# Patient Record
Sex: FEMALE | Race: BLACK OR AFRICAN AMERICAN
[De-identification: names, ages, dates, MRNs, and addresses within clinical notes are randomized per-mention and may not be internally consistent; named-entity substitution may affect disease eponyms.]

---

## 2017-10-30 ENCOUNTER — HOSPITAL ENCOUNTER (EMERGENCY)
Dept: HOSPITAL 62 - ER | Age: 52
Discharge: HOME | End: 2017-10-30
Payer: COMMERCIAL

## 2017-10-30 VITALS — DIASTOLIC BLOOD PRESSURE: 67 MMHG | SYSTOLIC BLOOD PRESSURE: 130 MMHG
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
## 2017-10-30 DIAGNOSIS — S63.501A: Primary | ICD-10-CM

## 2017-10-30 DIAGNOSIS — M25.531: ICD-10-CM

## 2017-10-30 DIAGNOSIS — M79.642: ICD-10-CM
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
## 2017-10-30 PROCEDURE — 73130 X-RAY EXAM OF HAND: CPT

## 2017-10-30 PROCEDURE — 73110 X-RAY EXAM OF WRIST: CPT

## 2017-10-30 PROCEDURE — 99283 EMERGENCY DEPT VISIT LOW MDM: CPT

## 2017-10-30 PROCEDURE — L3908 WHO COCK-UP NONMOLDE PRE OTS: HCPCS

## 2017-10-30 NOTE — RADIOLOGY REPORT (SQ)
EXAM DESCRIPTION:  HAND RIGHT 3 VIEWS



COMPLETED DATE/TIME:  10/30/2017 3:28 pm



REASON FOR STUDY:  pain and injury



COMPARISON:  None.



EXAM PARAMETERS:  NUMBER OF VIEWS: Three views.

TECHNIQUE: AP, lateral and oblique  radiographic images acquired of the right hand.

LIMITATIONS: None.



FINDINGS:  MINERALIZATION: Normal.

BONES: No acute fracture or dislocation.  No worrisome bone lesions.

JOINTS: No effusions.

SOFT TISSUES: No soft tissue swelling.  No foreign body.

OTHER: No other significant finding.



IMPRESSION:  NEGATIVE STUDY OF THE RIGHT HAND. NO RADIOGRAPHIC EVIDENCE OF ACUTE INJURY.



TECHNICAL DOCUMENTATION:  JOB ID:  [id number]

 2011 [employer]- All Rights Reserved

## 2017-10-30 NOTE — ER DOCUMENT REPORT
ED Hand/Wrist Injury





- General


Chief Complaint: Wrist Injury


Stated Complaint: RIGHT WRIST PAIN


Time Seen by Provider: 10/30/17 15:05


Mode of Arrival: Ambulatory


Information source: Patient


Notes: 





52-year-old female presents to ED for complaint of pain and swelling to the 

right wrist and hand.  She states that 3:30 AM while at work at the [employer] 

she was pushing a heavy mail container and she stopped pushing and then when 

she went to push again she had very sharp pain in her radial side of her right 

wrist and hand.  She states very shortly after that it started to swell and the 

pain started radiating around the hand and wrist.  She states she has been 

using ice since then but the pain is not getting better with Tylenol.  She 

states she has never injured this hand in the past.


TRAVEL OUTSIDE OF THE [place] IN LAST 30 DAYS: No





- HPI


Injury to: Hand, Wrist


Onset: This morning


Where: Work


Timing: Still present


Quality of pain: Sharp, Throbbing


Severity: Moderate


Pain Level: 4


Context: Swelling





- Related Data


Allergies/Adverse Reactions: 


 





No Known Allergies Allergy (Unverified 10/30/17 14:56)


 











Past Medical History





- General


Information source: Patient





- Social History


Smoking Status: Never Smoker


Cigarette use (# per day): No


Chew tobacco use (# tins/day): No


Smoking Education Provided: No


Frequency of alcohol use: Social


Drug Abuse: None


Occupation: [employer] office


Lives with: Alone


Family History: CAD, CVA, DM, Hyperlipidemia, Hypertension


Patient has suicidal ideation: No


Patient has homicidal ideation: No





- Past Medical History


Cardiac Medical History: Reports: None


Pulmonary Medical History: Reports: Hx Pneumonia


EENT Medical History: Reports: None


Neurological Medical History: Reports: None


Endocrine Medical History: Reports: None


Renal/ Medical History: Reports: None


Malignancy Medical History: Reports: None


GI Medical History: Reports: None


Musculoskeltal Medical History: Reports Hx Musculoskeletal Trauma - Strain to 

her back and her knee no broken bones


Skin Medical History: Reports None


Psychiatric Medical History: Reports: None


Traumatic Medical History: Reports: None


Infectious Medical History: Reports: None


Surgical Hx: Negative


Past Surgical History: Reports: None





Review of Systems





- Review of Systems


Constitutional: No symptoms reported


EENT: No symptoms reported


Cardiovascular: No symptoms reported


Respiratory: No symptoms reported


Gastrointestinal: No symptoms reported


Genitourinary: No symptoms reported


Female Genitourinary: No symptoms reported


Musculoskeletal: Other - Pain and swelling to the right wrist and hand


Skin: No symptoms reported


Hematologic/Lymphatic: No symptoms reported


Neurological/Psychological: No symptoms reported


-: Yes All other systems reviewed and negative





Physical Exam





- Vital signs


Vitals: 


 











Temp Pulse Resp BP Pulse Ox


 


 97.9 F   77   16   130/67 H  100 


 


 10/30/17 14:56  10/30/17 14:56  10/30/17 14:56  10/30/17 14:56  10/30/17 14:56











Interpretation: Normal





- General


General appearance: Appears well, Alert





- HEENT


Head: Normocephalic, Atraumatic


Eyes: Normal


Pupils: PERRL





- Respiratory


Respiratory status: No respiratory distress


Chest status: Nontender


Breath sounds: Normal


Chest palpation: Normal





- Cardiovascular


Rhythm: Regular


Heart sounds: Normal auscultation


Murmur: No





- Abdominal


Inspection: Normal


Distension: No distension


Bowel sounds: Normal


Tenderness: Nontender


Organomegaly: No organomegaly





- Back


Back: Normal, Nontender





- Extremities


General upper extremity: Normal temperature


General lower extremity: Normal inspection, Nontender, Normal color, Normal ROM

, Normal temperature, Normal weight bearing.  No: Hilario's sign


Wrist: Tender, Limited ROM - To pain when encouraged she can move in all planes

, Other - Mild swelling


Hand: Tender, No evidence of human bite, No evidence of FB





- Neurological


Neuro grossly intact: Yes


Cognition: Normal


Orientation: AAOx4


[place] Coma Scale Eye Opening: Spontaneous


[place] Coma Scale Verbal: Oriented


[place] Coma Scale Motor: Obeys Commands


[place] Coma Scale Total: 15


Speech: Normal


Motor strength normal: LUE, RUE, LLE, RLE


Sensory: Normal





- Psychological


Associated symptoms: Normal affect, Normal mood





- Skin


Skin Temperature: Warm


Skin Moisture: Dry


Skin Color: Normal





Course





- Re-evaluation


Re-evalutation: 





10/30/17 17:00


X-rays discussed with patient and written report given the patient to follow-up 

with orthopedics.  A cock-up splint was applied to the right wrist for her 

discomfort.  She was treated with Tylenol for her pain and given ice pack and 

instructed on use of ice and elevation for the discomfort.  There is no 

fractures noted on the x-ray.  There is slight swelling to the radial aspect of 

the wrist.  Will discharge home to follow-up with orthopedics.





- Vital Signs


Vital signs: 


 











Temp Pulse Resp BP Pulse Ox


 


 97.9 F   77   16   130/67 H  100 


 


 10/30/17 14:56  10/30/17 14:56  10/30/17 14:56  10/30/17 14:56  10/30/17 14:56














- Diagnostic Test


Radiology reviewed: Image reviewed, Reports reviewed





Procedures





- Immobilization


  ** Right Wrist


Time completed: 16:15


Pre-Proc Neuro Vasc Exam: Normal


Immobilizer type: Cock-up


Performed by: PCT


Post-Proc Neuro Vasc Exam: Normal


Alignment checked and good: Yes





Discharge





- Discharge


Clinical Impression: 


Right wrist sprain


Qualifiers:


 Encounter type: initial encounter Qualified Code(s): S63.501A - Unspecified 

sprain of right wrist, initial encounter





Condition: Stable


Disposition: HOME, SELF-CARE


Additional Instructions: 


SPRAIN:





     Your injury is a sprain.  A sprain results from stretching or tearing of 

the ligaments, usually from a twisting injury.  The ligaments will require time 

and protection in order to heal properly. Many sprains are quite disabling and 

should be taken seriously.


     The usual initial treatment of sprains is cold packs, elevation, and rest 

of the injured area.  Your physician has assessed the seriousness of your 

ligament injury, and has outlined a treatment plan. Understand that this 

treatment may change, depending on how you progress.


     If a re-examination was recommended, it is important that you follow up as 

instructed.  Call the doctor any time if there is severe pain, numbness, or 

loss of function in the injured area.





SPLINT PRECAUTIONS:


     A splint has been placed.  This will protect the area while healing 

begins.  Your problem does NOT normally require a cast.  It MUST, however, be 

held still!  Keep the splint on ALL THE TIME until instructed to remove it by 

the doctor.


     As you begin to use the area, be careful.  You shouldn't do anything which 

causes discomfort -- you may disturb the injury even with the splint in place.


     After the initial period of rest and elevation, if splint does not prevent 

pain when you move, come back.  You may require placement of a different splint

, or a cast.


     If there is unexpected severe pain, or numbness, discoloration, or 

swelling beyond the splint, you should return at once.  If you feel that the 

splint has broken or become loose, come back.








ICE & ELEVATION:


     Apply ice packs frequently against the painful area.  Many different 

schedules are recommended, such as "20 minutes on, 20 minutes off" or "one hour 

ice, two hours rest."  If you need to work, you may need to go longer between 

ice treatments.  You should plan to have the area ice packed AT LEAST one-

fourth of the time.


     The ice should be applied over the wrap, tape, or splint, or over a layer 

of cloth -- not directly against the skin.  Some ice bags have a built-in cloth 

and can be put directly on the skin.


     Your injured part should be elevated as much as possible over the next 48 

hours.  Try to keep the injury above the level of the heart. Avoid use of the 

injured area.  Elevation and rest will decrease the swelling.








USE OF OVER-THE-COUNTER IBUPROFEN:


     Ibuprofen (Advil, Nuprin, Medipren, Motrin IB) is a medication for fever 

and pain control.  In addition, it has anti- inflammatory effects which may be 

beneficial, especially in the treatment of injuries.


     It's best to take ibuprofen with food.  Persons with ulcer disease or 

allergy to aspirin should notify their physician of this before taking 

ibuprofen.


     Ibuprofen can be given every four to six hours, for a total of four doses 

daily.


     Age              Pain or fever dose          Antiinflammatory dose


     6-8 yr              200 mg (1 tab)                200 mg (1 tab)


     9-11 yr             200 mg (1 tab)                200-400 mg (1-2 tab)


     11-14 yr            200-400 mg (1-2 tab)         400 mg (2 tab)


     15-adult            400 mg (2 tab)                600 mg (3 tab)








FOLLOW-UP CARE:


If you have been referred to a physician for follow-up care, call the physician

s office for an appointment as you were instructed or within the next two days.

  If you experience worsening or a significant change in your symptoms, notify 

the physician immediately or return to the Emergency Department at any time for 

re-evaluation.


Prescriptions: 


Naproxen 500 mg PO BIDP PRN #14 tablet


 PRN Reason: 


Forms:  Elevated Blood Pressure, Return to Work


Referrals: 


RAQUEL HOLLAND DO [ACTIVE STAFF] - Follow up as needed

## 2017-10-30 NOTE — RADIOLOGY REPORT (SQ)
EXAM DESCRIPTION:  WRIST RIGHT 3 VIEWS



COMPLETED DATE/TIME:  10/30/2017 3:28 pm



REASON FOR STUDY:  pain and injury



COMPARISON:  None.



NUMBER OF VIEWS:  Three views.



TECHNIQUE:  AP, lateral, and oblique radiographic images acquired of the right wrist.



LIMITATIONS:  None.



FINDINGS:  MINERALIZATION: Normal.

BONES: No acute fracture or dislocation.  No worrisome bone lesions.  Normal alignment.

SOFT TISSUES: No soft tissue swelling.  No foreign body.

OTHER: No other significant finding.



IMPRESSION:  NEGATIVE STUDY OF THE RIGHT WRIST. NO RADIOGRAPHIC EVIDENCE OF ACUTE INJURY.



TECHNICAL DOCUMENTATION:  JOB ID:  [id number]

 2011 [employer]- All Rights Reserved

## 2018-07-11 ENCOUNTER — HOSPITAL ENCOUNTER (EMERGENCY)
Dept: HOSPITAL 62 - ER | Age: 53
Discharge: HOME | End: 2018-07-11
Payer: COMMERCIAL

## 2018-07-11 VITALS — DIASTOLIC BLOOD PRESSURE: 77 MMHG | SYSTOLIC BLOOD PRESSURE: 108 MMHG

## 2018-07-11 DIAGNOSIS — R07.9: ICD-10-CM

## 2018-07-11 DIAGNOSIS — R09.81: ICD-10-CM

## 2018-07-11 DIAGNOSIS — J18.9: Primary | ICD-10-CM

## 2018-07-11 DIAGNOSIS — R51: ICD-10-CM

## 2018-07-11 LAB
ADD MANUAL DIFF: NO
ALBUMIN SERPL-MCNC: 4.6 G/DL (ref 3.5–5)
ALP SERPL-CCNC: 84 U/L (ref 38–126)
ALT SERPL-CCNC: 26 U/L (ref 9–52)
ANION GAP SERPL CALC-SCNC: 14 MMOL/L (ref 5–19)
AST SERPL-CCNC: 22 U/L (ref 14–36)
BASOPHILS # BLD AUTO: 0 10^3/UL (ref 0–0.2)
BASOPHILS NFR BLD AUTO: 0.4 % (ref 0–2)
BILIRUB DIRECT SERPL-MCNC: 0.2 MG/DL (ref 0–0.4)
BILIRUB SERPL-MCNC: 1.1 MG/DL (ref 0.2–1.3)
BUN SERPL-MCNC: 14 MG/DL (ref 7–20)
CALCIUM: 10 MG/DL (ref 8.4–10.2)
CHLORIDE SERPL-SCNC: 103 MMOL/L (ref 98–107)
CK MB SERPL-MCNC: 0.31 NG/ML (ref ?–4.55)
CK SERPL-CCNC: 79 U/L (ref 30–135)
CO2 SERPL-SCNC: 24 MMOL/L (ref 22–30)
EOSINOPHIL # BLD AUTO: 0.1 10^3/UL (ref 0–0.6)
EOSINOPHIL NFR BLD AUTO: 1.4 % (ref 0–6)
ERYTHROCYTE [DISTWIDTH] IN BLOOD BY AUTOMATED COUNT: 13.2 % (ref 11.5–14)
GLUCOSE SERPL-MCNC: 123 MG/DL (ref 75–110)
HCT VFR BLD CALC: 36.4 % (ref 36–47)
HGB BLD-MCNC: 12.2 G/DL (ref 12–15.5)
LYMPHOCYTES # BLD AUTO: 2.6 10^3/UL (ref 0.5–4.7)
LYMPHOCYTES NFR BLD AUTO: 43.9 % (ref 13–45)
MCH RBC QN AUTO: 28.9 PG (ref 27–33.4)
MCHC RBC AUTO-ENTMCNC: 33.4 G/DL (ref 32–36)
MCV RBC AUTO: 87 FL (ref 80–97)
MONOCYTES # BLD AUTO: 1 10^3/UL (ref 0.1–1.4)
MONOCYTES NFR BLD AUTO: 17 % (ref 3–13)
NEUTROPHILS # BLD AUTO: 2.2 10^3/UL (ref 1.7–8.2)
NEUTS SEG NFR BLD AUTO: 37.3 % (ref 42–78)
PLATELET # BLD: 179 10^3/UL (ref 150–450)
POTASSIUM SERPL-SCNC: 4.6 MMOL/L (ref 3.6–5)
PROT SERPL-MCNC: 8 G/DL (ref 6.3–8.2)
RBC # BLD AUTO: 4.21 10^6/UL (ref 3.72–5.28)
SODIUM SERPL-SCNC: 140.5 MMOL/L (ref 137–145)
TOTAL CELLS COUNTED % (AUTO): 100 %
TROPONIN I SERPL-MCNC: < 0.012 NG/ML
WBC # BLD AUTO: 5.9 10^3/UL (ref 4–10.5)

## 2018-07-11 PROCEDURE — 84484 ASSAY OF TROPONIN QUANT: CPT

## 2018-07-11 PROCEDURE — 93010 ELECTROCARDIOGRAM REPORT: CPT

## 2018-07-11 PROCEDURE — 36415 COLL VENOUS BLD VENIPUNCTURE: CPT

## 2018-07-11 PROCEDURE — 85379 FIBRIN DEGRADATION QUANT: CPT

## 2018-07-11 PROCEDURE — 85025 COMPLETE CBC W/AUTO DIFF WBC: CPT

## 2018-07-11 PROCEDURE — 82553 CREATINE MB FRACTION: CPT

## 2018-07-11 PROCEDURE — 82550 ASSAY OF CK (CPK): CPT

## 2018-07-11 PROCEDURE — 80053 COMPREHEN METABOLIC PANEL: CPT

## 2018-07-11 PROCEDURE — 71045 X-RAY EXAM CHEST 1 VIEW: CPT

## 2018-07-11 PROCEDURE — 93005 ELECTROCARDIOGRAM TRACING: CPT

## 2018-07-11 PROCEDURE — 99285 EMERGENCY DEPT VISIT HI MDM: CPT

## 2018-07-11 NOTE — RADIOLOGY REPORT (SQ)
EXAM DESCRIPTION: 



XR CHEST 1 VIEW



COMPLETED DATE/TME:  07/11/2018 03:25



CLINICAL HISTORY: chest pain



COMPARISON: None.



FINDINGS: Single frontal view of the chest.  The

cardiomediastinal silhouette has normal size and contour. No

consolidation, pneumothorax, or pleural effusion.  No displaced

rib fractures identified.  Upper abdominal soft tissues are

unremarkable. Leads overlie the chest.



IMPRESSION:



1. No acute pulmonary process identified.

## 2018-07-11 NOTE — ER DOCUMENT REPORT
ED General





- General


Chief Complaint: Chest Pain


Stated Complaint: CHEST PAIN


Time Seen by Provider: 07/11/18 06:10


TRAVEL OUTSIDE OF THE [place] IN LAST 30 DAYS: No





- HPI


Notes: 





52-year-old female presents with 3-day history of shortness of breath, headache

, nasal congestion.  Patient's headache is 8/10 throbbing in nature without 

radiation.  Nothing has made the pain better or worse.





Nasal congestion is getting worse and worse since Sunday.  Patient had 

difficulty at work yesterday breathing.  Also endorses a sharp left-sided chest 

pain without radiation independent of exertion.  Patient states this is 

improved but she is concerned she might have a pneumonia, patient does have 

history of pneumonia.





Denies fever chills or constitutional symptoms.





- Related Data


Allergies/Adverse Reactions: 


 





No Known Allergies Allergy (Unverified 10/30/17 14:56)


 











Past Medical History





- Social History


Smoking Status: Never Smoker


Chew tobacco use (# tins/day): No


Frequency of alcohol use: Occasional


Drug Abuse: None


Family History: CAD, CVA, DM, Hyperlipidemia, Hypertension


Patient has suicidal ideation: No


Patient has homicidal ideation: No


Pulmonary Medical History: Reports: Hx Pneumonia


Renal/ Medical History: Denies: Hx Peritoneal Dialysis


Musculoskeltal Medical History: Reports Hx Musculoskeletal Trauma - Strain to 

her back and her knee no broken bones





Review of Systems





- Review of Systems


Notes: 





REVIEW OF SYSTEMS:


CONSTITUTIONAL: -fevers, -chills


EENT: -eye pain, -difficulty swallowing, -nasal congestion


CARDIOVASCULAR:  chest pain, -syncope.


RESPIRATORY: -cough,  + SOB


GASTROINTESTINAL: -abdominal pain, -nausea, -vomiting, -diarrhea


GENITOURINARY: -dysuria, -hematuria


MUSCULOSKELETAL: -back pain, -neck pain


SKIN: -rash or skin lesions.


HEMATOLOGIC: -easy bruising or bleeding.


LYMPHATIC: -swollen, enlarged glands.


NEUROLOGICAL: -altered mental status or loss of consciousness, -headache, -

neurologic symptoms


PSYCHIATRIC: -anxiety, -depression.


ALL OTHER SYSTEMS REVIEWED AND NEGATIVE.





Physical Exam





- Vital signs


Vitals: 


 











Temp Pulse Resp BP Pulse Ox


 


 98.4 F   73   17   113/69   100 


 


 07/11/18 03:40  07/11/18 03:40  07/11/18 03:40  07/11/18 03:40  07/11/18 03:40














- Notes


Notes: 





PHYSICAL EXAMINATION:


GENERAL: Well-appearing, well-nourished and in no acute distress.


HEAD: Atraumatic, normocephalic.


EYES: Pupils equal round and reactive to light, extraocular movements intact, 

sclera anicteric, conjunctiva are normal.


ENT: nares patent, oropharynx clear without exudates.  Moist mucous membranes.


NECK: Normal range of motion, supple without lymphadenopathy


LUNGS: Breath sounds clear to auscultation bilaterally and equal.  No wheezes 

rales or rhonchi.


HEART: Regular rate and rhythm without murmurs


ABDOMEN: Soft, nontender, normoactive bowel sounds.  No guarding, no rebound.  

No masses appreciated.


EXTREMITIES: Normal range of motion, no pitting or edema.  No cyanosis.


NEUROLOGICAL: Cranial nerves grossly intact.  Normal speech, normal gait.  

Normal sensory and motor exams.


PSYCH: Normal mood, normal affect.


SKIN: Warm, Dry, normal turgor, no rashes or lesions noted.





Course





- Re-evaluation


Re-evalutation: 





07/11/18 06:43


Pleasant female no acute distress presents with headache nasal congestion some 

sharp chest pain shortness of breath.





Patient has history of pneumonia in the past.  Patient's EKG unremarkable.  

Extensive lab workup unremarkable.  D-dimer is negative by age adjustment.


07/11/18 06:44





07/11/18 06:46





Well-appearing female no acute distress.  Oxygen saturations greater than 97% 

on room air.  Will be given prescription for doxycycline and discharged home 

improved





Scheduled to see her PCP in 2 days.





- Vital Signs


Vital signs: 


 











Temp Pulse Resp BP Pulse Ox


 


 98.4 F   73   18   113/69   100 


 


 07/11/18 03:40  07/11/18 03:40  07/11/18 05:00  07/11/18 03:40  07/11/18 03:40














- Laboratory


Result Diagrams: 


 07/11/18 03:00





 07/11/18 03:00


Laboratory results interpreted by me: 


 











  07/11/18 07/11/18 07/11/18





  03:00 03:00 03:00


 


Seg Neutrophils %  37.3 L  


 


Monocytes %  17.0 H  


 


D-Dimer    0.52 H


 


Glucose   123 H 














- EKG Interpretation by Me


Additional EKG results interpreted by me: 





07/11/18 06:18


Normal sinus rhythm, normal intervals, no ST elevations or depressions


07/11/18 06:44


 71 bpm





Discharge





- Discharge


Clinical Impression: 


Pneumonia


Qualifiers:


 Pneumonia type: due to unspecified organism Laterality: unspecified laterality 

Lung location: unspecified part of lung Qualified Code(s): J18.9 - Pneumonia, 

unspecified organism





Condition: Stable


Instructions:  Pneumonia ([facility])


Additional Instructions: 


See your PCP in 2 days


Prescriptions: 


Doxycycline Hyclate 100 mg PO BID #14 capsule


Referrals: 


LOCALMD,NO [Primary Care Provider] - Follow up as needed